# Patient Record
Sex: FEMALE | Race: WHITE | NOT HISPANIC OR LATINO | URBAN - METROPOLITAN AREA
[De-identification: names, ages, dates, MRNs, and addresses within clinical notes are randomized per-mention and may not be internally consistent; named-entity substitution may affect disease eponyms.]

---

## 2017-10-23 ENCOUNTER — IMPORTED ENCOUNTER (OUTPATIENT)
Dept: URBAN - METROPOLITAN AREA CLINIC 9 | Facility: CLINIC | Age: 57
End: 2017-10-23

## 2018-11-26 ENCOUNTER — IMPORTED ENCOUNTER (OUTPATIENT)
Dept: URBAN - METROPOLITAN AREA CLINIC 9 | Facility: CLINIC | Age: 58
End: 2018-11-26

## 2019-12-04 ENCOUNTER — IMPORTED ENCOUNTER (OUTPATIENT)
Dept: URBAN - METROPOLITAN AREA CLINIC 9 | Facility: CLINIC | Age: 59
End: 2019-12-04

## 2019-12-04 PROBLEM — H43.813: Noted: 2019-12-04

## 2019-12-04 PROBLEM — H25.11: Noted: 2019-12-04

## 2019-12-04 PROBLEM — H04.123: Noted: 2019-12-04

## 2019-12-04 PROBLEM — H25.13: Noted: 2019-12-04

## 2021-09-23 ENCOUNTER — PREPPED CHART (OUTPATIENT)
Dept: URBAN - METROPOLITAN AREA CLINIC 4 | Facility: CLINIC | Age: 61
End: 2021-09-23

## 2021-09-29 ENCOUNTER — IMPORTED ENCOUNTER (OUTPATIENT)
Dept: URBAN - METROPOLITAN AREA CLINIC 9 | Facility: CLINIC | Age: 61
End: 2021-09-29

## 2021-10-02 ASSESSMENT — VISUAL ACUITY
OD_CC: 20/25 SN
OS_CC: 20/25 - SN

## 2021-10-02 ASSESSMENT — TONOMETRY
OD_IOP_MMHG: 11
OS_IOP_MMHG: 12

## 2021-10-10 ASSESSMENT — VISUAL ACUITY
OS_SC: 20/40+2
OD_SC: 20/60
OU_SC: 20/40

## 2021-10-10 ASSESSMENT — TONOMETRY
OS_IOP_MMHG: 11
OD_IOP_MMHG: 14

## 2021-10-11 ENCOUNTER — SURGERY/PROCEDURE (OUTPATIENT)
Dept: URBAN - METROPOLITAN AREA SURGERY 6 | Facility: SURGERY | Age: 61
End: 2021-10-11

## 2021-10-11 DIAGNOSIS — H25.12: ICD-10-CM

## 2021-10-11 PROCEDURE — 99199LCS5

## 2021-10-11 PROCEDURE — 66984 XCAPSL CTRC RMVL W/O ECP: CPT

## 2021-10-12 ENCOUNTER — 1 DAY POST-OP (OUTPATIENT)
Dept: URBAN - METROPOLITAN AREA CLINIC 4 | Facility: CLINIC | Age: 61
End: 2021-10-12

## 2021-10-12 DIAGNOSIS — Z96.1: ICD-10-CM

## 2021-10-12 DIAGNOSIS — H25.12: ICD-10-CM

## 2021-10-12 PROCEDURE — 99024 POSTOP FOLLOW-UP VISIT: CPT

## 2021-10-12 ASSESSMENT — VISUAL ACUITY
OS_SC: 20/25-1
OS_SC: J2

## 2021-10-12 ASSESSMENT — TONOMETRY: OS_IOP_MMHG: 28

## 2021-10-15 ENCOUNTER — POST-OP (OUTPATIENT)
Dept: URBAN - METROPOLITAN AREA CLINIC 4 | Facility: CLINIC | Age: 61
End: 2021-10-15

## 2021-10-15 DIAGNOSIS — H25.11: ICD-10-CM

## 2021-10-15 DIAGNOSIS — Z96.1: ICD-10-CM

## 2021-10-15 PROCEDURE — 92136 OPHTHALMIC BIOMETRY: CPT

## 2021-10-15 PROCEDURE — 99024 POSTOP FOLLOW-UP VISIT: CPT

## 2021-10-15 ASSESSMENT — VISUAL ACUITY
OD_SC: 20/100
OS_SC: 20/20
OS_SC: J1

## 2021-10-18 ASSESSMENT — VISUAL ACUITY
OS_CC: 20/50 -2 SN
OS_SC: 20/40 SN
OD_CC: 20/20 SN
OS_CC: 20/25 SN
OD_SC: 20/50 -2 SN
OS_CC: 20/40 + SN
OS_CC: 20/30 SN
OD_CC: 20/25 SN
OS_SC: 20/25 SN
OD_CC: 20/30 SN
OD_CC: 20/50 SN
OD_SC: 20/60 SN

## 2021-10-18 ASSESSMENT — TONOMETRY
OS_IOP_MMHG: 10
OS_IOP_MMHG: 14
OD_IOP_MMHG: 10
OD_IOP_MMHG: 14

## 2021-10-25 ENCOUNTER — POST-OP (OUTPATIENT)
Dept: URBAN - METROPOLITAN AREA CLINIC 4 | Facility: CLINIC | Age: 61
End: 2021-10-25

## 2021-10-25 ENCOUNTER — SURGERY/PROCEDURE (OUTPATIENT)
Dept: URBAN - METROPOLITAN AREA SURGERY 6 | Facility: SURGERY | Age: 61
End: 2021-10-25

## 2021-10-25 DIAGNOSIS — H25.11: ICD-10-CM

## 2021-10-25 DIAGNOSIS — Z96.1: ICD-10-CM

## 2021-10-25 PROCEDURE — 99199LCS6

## 2021-10-25 PROCEDURE — 99024 POSTOP FOLLOW-UP VISIT: CPT

## 2021-10-25 PROCEDURE — 66984 XCAPSL CTRC RMVL W/O ECP: CPT

## 2021-10-25 ASSESSMENT — TONOMETRY
OD_IOP_MMHG: 36
OD_IOP_MMHG: 38
OD_IOP_MMHG: 16

## 2021-10-25 ASSESSMENT — VISUAL ACUITY
OD_SC: 20/60
OD_SC: J7

## 2021-11-08 ENCOUNTER — POST-OP CATARACT (OUTPATIENT)
Dept: URBAN - METROPOLITAN AREA CLINIC 4 | Facility: CLINIC | Age: 61
End: 2021-11-08

## 2021-11-08 DIAGNOSIS — Z96.1: ICD-10-CM

## 2021-11-08 PROCEDURE — 99024 POSTOP FOLLOW-UP VISIT: CPT

## 2021-11-08 ASSESSMENT — TONOMETRY
OD_IOP_MMHG: 12
OS_IOP_MMHG: 14

## 2021-11-08 ASSESSMENT — VISUAL ACUITY
OD_SC: 20/30+1
OS_SC: 20/20

## 2022-02-09 ENCOUNTER — ESTABLISHED PATIENT (OUTPATIENT)
Dept: URBAN - METROPOLITAN AREA CLINIC 4 | Facility: CLINIC | Age: 62
End: 2022-02-09

## 2022-02-09 DIAGNOSIS — H04.123: ICD-10-CM

## 2022-02-09 DIAGNOSIS — Z96.1: ICD-10-CM

## 2022-02-09 PROCEDURE — 99214 OFFICE O/P EST MOD 30 MIN: CPT

## 2022-02-09 ASSESSMENT — VISUAL ACUITY
OD_SC: 20/25
OS_SC: 20/25
OU_SC: J2

## 2022-02-09 ASSESSMENT — TONOMETRY
OD_IOP_MMHG: 13
OS_IOP_MMHG: 16

## 2022-07-07 RX ORDER — DULOXETIN HYDROCHLORIDE 60 MG/1
CAPSULE, DELAYED RELEASE ORAL
COMMUNITY

## 2022-07-07 RX ORDER — HYDROCHLOROTHIAZIDE 12.5 MG/1
TABLET ORAL
COMMUNITY

## 2022-07-07 RX ORDER — TELMISARTAN 40 MG/1
TABLET ORAL
COMMUNITY

## 2023-02-01 ENCOUNTER — APPOINTMENT (OUTPATIENT)
Dept: URBAN - METROPOLITAN AREA SURGERY 15 | Age: 63
Setting detail: DERMATOLOGY
End: 2023-02-02

## 2023-02-01 PROBLEM — C44.722 SQUAMOUS CELL CARCINOMA OF SKIN OF RIGHT LOWER LIMB, INCLUDING HIP: Status: ACTIVE | Noted: 2023-02-01

## 2023-02-01 PROCEDURE — OTHER SURGICAL DECISION MAKING: OTHER

## 2023-02-01 PROCEDURE — OTHER COUNSELING: OTHER

## 2023-02-01 PROCEDURE — OTHER MOHS SURGERY: OTHER

## 2023-02-01 PROCEDURE — 17313 MOHS 1 STAGE T/A/L: CPT

## 2023-02-01 NOTE — PROCEDURE: MOHS SURGERY
Body Location Override (Optional - Billing Will Still Be Based On Selected Body Map Location If Applicable): right shin superior

## 2023-12-05 NOTE — PROCEDURE: SURGICAL DECISION MAKING
Duplicate encounter.  
Initial Decision For Surgery?: No
Date Of Surgery - Today Or Tomorrow?: today
Discussion: After the mohs procedure was complete, a separate and distinct evaluation and management was performed for the resultant surgical defect for potential reconstruction using flap or graft.  Complications and risk of morbidity of each were discussed including (but not limited to) scarring, which could distort a free anatomic margin of the eyelid, nose, ear or lip.
Identified Risk Factors Documented?: yes
Complexity (Necessary For Coding; Major - 90 Day Global With Some Exceptions; Minor - 10 Day Global): major
Risk Assessment Explanation (Does Not Render In The Note): Clinical determination of the probability and/or consequences of an event, such as surgery. Clinical assessment of the level of risk is affected by the nature of the event under consideration for the patient. Modifier 57 is used to indicate an Evaluation and Management (E/M) service resulted in the initial decision to perform surgery either the day before a major surgery (90 day global) or the day of a major surgery.

## 2024-08-08 NOTE — PROCEDURE: MOHS SURGERY
Called with MRI results, but NA/LVM. Will try back later.  Cheiloplasty (Less Than 50%) Text: A decision was made to reconstruct the defect with a  cheiloplasty.  The defect was undermined extensively.  Additional orbicularis oris muscle was excised with a 15 blade scalpel.  The defect was converted into a full thickness wedge, of less than 50% of the vertical height of the lip, to facilite a better cosmetic result.  Small vessels were then tied off with 5-0 monocyrl. The orbicularis oris, superficial fascia, adipose and dermis were then reapproximated.  After the deeper layers were approximated the epidermis was reapproximated with particular care given to realign the vermilion border.

## 2025-04-29 ENCOUNTER — COMPREHENSIVE EXAM (OUTPATIENT)
Age: 65
End: 2025-04-29

## 2025-04-29 DIAGNOSIS — H52.4: ICD-10-CM

## 2025-04-29 PROCEDURE — 92014 COMPRE OPH EXAM EST PT 1/>: CPT

## 2025-04-29 PROCEDURE — 92015 DETERMINE REFRACTIVE STATE: CPT
